# Patient Record
Sex: FEMALE | Race: WHITE | NOT HISPANIC OR LATINO
[De-identification: names, ages, dates, MRNs, and addresses within clinical notes are randomized per-mention and may not be internally consistent; named-entity substitution may affect disease eponyms.]

---

## 2022-02-24 ENCOUNTER — NON-APPOINTMENT (OUTPATIENT)
Age: 52
End: 2022-02-24

## 2022-03-15 ENCOUNTER — APPOINTMENT (OUTPATIENT)
Dept: HEMATOLOGY ONCOLOGY | Facility: CLINIC | Age: 52
End: 2022-03-15

## 2022-03-28 NOTE — DISCUSSION/SUMMARY
[FreeTextEntry1] : REASON FOR CONSULT\par Arina Lira is a 51-year-old female referred by Dr. Jacinta Arndt for cancer genetic counseling and update testing due to a personal and family history of cancer. Ms. Lira was seen on March 15, 2022 at which time medical and family history was ascertained and a pedigree constructed. \par \par RELEVANT MEDICAL HISTORY\par Ms. Lira was diagnosed with right triple negative breast cancer at the age of 42. Pathology report revealed invasive ductal carcinoma (ER-/TX-/HER2-). Consequently, she underwent lumpectomy with chemotherapy and radiation therapy.\par \par Ms. Lira initially pursued genetic testing on 2011 using [facility] test, ordered by Dr. Sandy Hurley, which was negative for any variants in the BRCA1 and BRCA2 genes (CARLIN not included, report reviewed and confirmed).\par \par OTHER MEDICAL AND SURGICAL HISTORY:\par •	Medical History: no major medical history reported.\par •	Surgical History:  x2\par \par OB/GYN HISTORY:\par Obstetrical History: \par Age at Menarche: 12\par Menopausal Status: Perimenopausal\par Age at First Live Birth: 32\par Oral Contraceptive Use: Yes, 1 year\par Hormone Replacement Therapy: No\par \par CANCER SCREENING HISTORY:  \par Breast: \par •	Mammography: 2021- reportedly wnl\par •	MRI: 2021- reportedly wnl\par GYN:\par •	Pelvic Examination: Annual- reportedly wnl\par Colon:\par •	Colonoscopy: 2020- reported no history of polyps, repeat in 10 years\par Skin:  \par •	FBSE: Yes\par •	Lesions biopsied/removed: No\par \par SOCIAL HISTORY:\par •	Tobacco-product use: No\par •	Environmental exposures: No \par \par FAMILY HISTORY:\par Maternal and paternal ancestry was reported as Urdu. Ashkenazi Restorationist ancestry was denied. A detailed family history of cancer was ascertained, see below and scanned chart for pedigree. \par \par According to Ms. Lira no one else in the family has had germline testing for cancer susceptibility. Consanguinity was denied. \par 	\par RISK ASSESSMENT:\par Ms. Lira’s personal and family history is suggestive of a hereditary cancer syndrome given her history of triple-negative breast cancer at age 42 and her mother’s history of breast cancer at age 41. The patient meets National Comprehensive Cancer Network (NCCN) criteria for genetic testing. We recommended genetic testing for genes associated with melanoma, breast, and gynecological cancers. This test analyzes 25 genes: TINA, BAP1, BARD1, BRCA1, BRCA2, BRIP1, CDH1, CDK4, CDKN2A, CHEK2, EPCAM, MITF, MLH1, MSH2, MSH6, NF1, PALB2, PMS2, POT1, PTEN, RAD51C, RAD51D, RB1, STK11, and TP53.\par \par The risks, benefits and limitations of genetic testing were discussed with Ms. Lira. In addition, we discussed the purpose of genetic testing and possible test results (positive, negative, inconclusive) along with associated medical management options and psychosocial implications. Insurance coverage and potential out of pocket costs were also discussed. \par \par It was explained that risk assessment is based upon medical and family history as provided and may change in the future should new information be obtained. \par \par Following our discussion, Ms. Lira consented to the above-mentioned genetic testing panel. Blood was drawn in our laboratory and sent to [facility] today.\par \par PLAN:\par \par 1.	Blood drawn today will be sent to [facility] for analysis. \par 2.	We will contact Ms. Lira to schedule a follow-up appointment once the results are available. Results generally return in 2-3 weeks. \par \par For any additional questions please call Cancer Genetics at [phone]. \par \par \par Amanda Dunn MS, [facility] [place]\par Genetic Counselor, Cancer Genetics\par \par \par CC: \par Jacinta Arndt MD\par \par \par \par

## 2022-03-31 ENCOUNTER — TRANSCRIPTION ENCOUNTER (OUTPATIENT)
Age: 52
End: 2022-03-31

## 2022-04-04 ENCOUNTER — NON-APPOINTMENT (OUTPATIENT)
Age: 52
End: 2022-04-04

## 2022-04-05 NOTE — DISCUSSION/SUMMARY
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 This session was conducted via telephone. \par \par Ms. Lira was originally seen by [facility] on March 15, 2022 for hereditary cancer predisposition risk assessment due to a personal and family history of cancer. At that time, Ms. Lira decided to pursue genetic testing for genes associated with melanoma, breast, and gynecological cancers offered by [facility].\par \par TEST RESULTS: \par \par VARIANT OF UNCERTAIN SIGNIFICANCE (VUS)*- CDH1 (c.2164+3A>G, Intronic)\par \par NO pathogenic (disease-causing) variants or additional VUSs were detected in the following genes [25]:  TINA, BAP1, BARD1, BRCA1, BRCA2, BRIP1, CDH1, CDK4, CDKN2A, CHEK2, EPCAM, MITF, MLH1, MSH2, MSH6, NF1, PALB2, PMS2, POT1, PTEN, RAD51C, RAD51D, RB1, STK11, and TP53.\par \par *Please note: there are conflicting interpretations of pathogenicity between laboratories for this variant. While [facility] and [facility] are currently classifying this variant as a VUS, [facility] is currently classifying this variant as likely benign.\par \par RESULTS INTERPRETATION AND ASSESSMENT:\par At this time the available evidence is insufficient to determine the role of this VUS in disease and the clinical significance of this result is uncertain. Individuals with a pathogenic mutation in the CDH1 gene may have an increased risk for gastric cancer and female breast cancer. It is unknown if the patient has an increased risk for the cancers associated with CDH1 gene at this time however we did discuss that it is somewhat reassuring that another laboratory is classifying this variant as likely benign. \par \par The detection of this VUS does NOT currently change the patient’s medical management. It is NOT recommended at this time that family members use this result for predictive genetic testing or medical management decisions. With more research, a VUS may be reclassified as either disease-causing or benign. Ms. Lira was encouraged to contact us every 2-3 years to enquire about any new information for this variant, or sooner if there are any changes in her personal or family history of cancer.  Such updates could possibly change our risk assessment and recommendations.\par \par We discussed that the cause of the Ms. Lira’s personal and family history of cancer remains unknown and that this result does not rule out a hereditary cancer risk in the patient since it is possible, although unlikely, the patient has a mutation that is not detectable by this analysis or is in an unidentified gene. It is also possible there is a hereditary cancer predisposition in the family, but the patient did not inherit it. \par \par Given Ms. Lira’s personal and current reported family history of cancer, and her negative genetic test results, the following screening guidelines and risk-reducing recommendations were discussed:\par \par BREAST: \par •	Long-term management and surveillance should be based on Ms. Lira’s on- or post-treatment protocol as recommended by her breast care team. \par \par OTHER: \par •	In the absence of other indications, Ms. Liar should practice age-appropriate cancer screening of other organ systems as recommended for the general population.\par \par PLAN:\par 1.	These results do not change Ms. Lira’s medical management. Long-term management and surveillance should be based on the patient’s on- or post-treatment protocol as recommended by her breast care team (and general population guidelines for other cancers).\par 2.	Testing of family members for this VUS is not indicated.\par 3.	Patient informed consult note(s) will be available through their [facility] patient portal and genetic test results will be released via [facility]’s portal.\par 4.	Ms. Lira was encouraged to contact us every 2-3 years to check on any changes in interpretation of the VUS, or sooner if there are changes in her personal or family history of cancer.\par \par For any additional questions please call Cancer Genetics at [phone]. \par \par \par Amanda Dunn MS, [facility] [place] Genetic Counselor, Cancer Genetics\par \par \par CC: \par Jacinta Arndt MD\par \par \par \par \par

## 2025-04-14 ENCOUNTER — NON-APPOINTMENT (OUTPATIENT)
Age: 55
End: 2025-04-14

## 2025-05-16 ENCOUNTER — NON-APPOINTMENT (OUTPATIENT)
Age: 55
End: 2025-05-16

## 2025-05-19 ENCOUNTER — NON-APPOINTMENT (OUTPATIENT)
Age: 55
End: 2025-05-19

## 2025-05-21 ENCOUNTER — APPOINTMENT (OUTPATIENT)
Dept: BREAST CENTER | Facility: CLINIC | Age: 55
End: 2025-05-21
Payer: COMMERCIAL

## 2025-05-21 VITALS
DIASTOLIC BLOOD PRESSURE: 94 MMHG | HEART RATE: 84 BPM | SYSTOLIC BLOOD PRESSURE: 133 MMHG | WEIGHT: 120 LBS | HEIGHT: 61 IN | BODY MASS INDEX: 22.66 KG/M2

## 2025-05-21 DIAGNOSIS — Z17.421 MALIGNANT NEOPLASM OF UNSPECIFIED SITE OF UNSPECIFIED FEMALE BREAST: ICD-10-CM

## 2025-05-21 DIAGNOSIS — Z13.71 ENCOUNTER FOR NONPROCREATIVE SCREENING FOR GENETIC DISEASE CARRIER STATUS: ICD-10-CM

## 2025-05-21 DIAGNOSIS — Z80.1 FAMILY HISTORY OF MALIGNANT NEOPLASM OF TRACHEA, BRONCHUS AND LUNG: ICD-10-CM

## 2025-05-21 DIAGNOSIS — Z92.3 PERSONAL HISTORY OF IRRADIATION: ICD-10-CM

## 2025-05-21 DIAGNOSIS — Z85.3 PERSONAL HISTORY OF MALIGNANT NEOPLASM OF BREAST: ICD-10-CM

## 2025-05-21 DIAGNOSIS — Z80.3 FAMILY HISTORY OF MALIGNANT NEOPLASM OF BREAST: ICD-10-CM

## 2025-05-21 DIAGNOSIS — Z92.89 PERSONAL HISTORY OF OTHER MEDICAL TREATMENT: ICD-10-CM

## 2025-05-21 DIAGNOSIS — Z80.9 FAMILY HISTORY OF MALIGNANT NEOPLASM, UNSPECIFIED: ICD-10-CM

## 2025-05-21 DIAGNOSIS — N60.19 DIFFUSE CYSTIC MASTOPATHY OF UNSPECIFIED BREAST: ICD-10-CM

## 2025-05-21 DIAGNOSIS — C50.919 MALIGNANT NEOPLASM OF UNSPECIFIED SITE OF UNSPECIFIED FEMALE BREAST: ICD-10-CM

## 2025-05-21 DIAGNOSIS — Z12.39 ENCOUNTER FOR OTHER SCREENING FOR MALIGNANT NEOPLASM OF BREAST: ICD-10-CM

## 2025-05-21 DIAGNOSIS — Z92.21 PERSONAL HISTORY OF ANTINEOPLASTIC CHEMOTHERAPY: ICD-10-CM

## 2025-05-21 DIAGNOSIS — Z91.89 OTHER SPECIFIED PERSONAL RISK FACTORS, NOT ELSEWHERE CLASSIFIED: ICD-10-CM

## 2025-05-21 PROCEDURE — 99205 OFFICE O/P NEW HI 60 MIN: CPT

## 2025-05-21 RX ORDER — CHASTE TREE FRUIT 400 MG
CAPSULE ORAL
Refills: 0 | Status: ACTIVE | COMMUNITY

## 2025-05-21 RX ORDER — MULTIVITAMIN
TABLET ORAL
Refills: 0 | Status: ACTIVE | COMMUNITY

## 2025-05-25 PROBLEM — Z85.3 HISTORY OF BREAST CANCER: Status: ACTIVE | Noted: 2025-05-16

## 2025-08-12 ENCOUNTER — RESULT REVIEW (OUTPATIENT)
Age: 55
End: 2025-08-12

## 2025-08-14 ENCOUNTER — NON-APPOINTMENT (OUTPATIENT)
Age: 55
End: 2025-08-14